# Patient Record
Sex: MALE | Race: OTHER | HISPANIC OR LATINO | URBAN - METROPOLITAN AREA
[De-identification: names, ages, dates, MRNs, and addresses within clinical notes are randomized per-mention and may not be internally consistent; named-entity substitution may affect disease eponyms.]

---

## 2022-10-21 ENCOUNTER — EMERGENCY (EMERGENCY)
Facility: HOSPITAL | Age: 27
LOS: 1 days | Discharge: HOME | End: 2022-10-21
Attending: EMERGENCY MEDICINE
Payer: MEDICAID

## 2022-10-21 VITALS
TEMPERATURE: 98 F | RESPIRATION RATE: 20 BRPM | SYSTOLIC BLOOD PRESSURE: 110 MMHG | OXYGEN SATURATION: 98 % | HEART RATE: 75 BPM | WEIGHT: 199.96 LBS | HEIGHT: 72 IN | DIASTOLIC BLOOD PRESSURE: 64 MMHG

## 2022-10-21 PROCEDURE — 99282 EMERGENCY DEPT VISIT SF MDM: CPT

## 2022-10-21 PROCEDURE — 99284 EMERGENCY DEPT VISIT MOD MDM: CPT

## 2022-10-21 NOTE — ED STATDOCS - NSFOLLOWUPINSTRUCTIONS_ED_ALL_ED_FT
Dolor de espalda sathish en los adultos    Acute Back Pain, Adult      El dolor de espalda sathish es repentino y por lo general no dura mucho tiempo. Se debe generalmente a jakob lesión de los músculos y tejidos de la espalda. La lesión puede ser el resultado de:  •Estiramiento en exceso o desgarro de un músculo, tendón o ligamento. Los ligamentos son tejidos que conectan los huesos. Levantar algo de forma incorrecta puede producir un esguince de espalda.      •Desgaste (degeneración) de los discos vertebrales. Los discos vertebrales son tejidos circulares que proporcionan amortiguación entre los huesos de la columna vertebral (vértebras).      •Movimientos de giro, jasbir al practicar deportes o realizar trabajos de jardinería.      •Un golpe en la espalda.      •Artritis.      Es posible que le realicen un examen físico, análisis de laboratorio u otros estudios de diagnóstico por imágenes para encontrar la causa del dolor. El dolor de espalda sathish generalmente desaparece con reposo y cuidados en la casa.      Siga estas instrucciones en pond casa:    Control del dolor, la rigidez y la hinchazón     •Use los medicamentos de venta sarah y los recetados solamente jasbir se lo haya indicado el médico. El tratamiento puede incluir medicamentos para el dolor y la inflamación que se asha por la boca o que se aplican sobre la piel, o relajantes musculares.    •El médico puede recomendarle que se aplique hielo darin las primeras 24 a 48 horas después del comienzo del dolor. Para hacer esto:  •Ponga el hielo en jakob bolsa plástica.      •Coloque jakob toalla entre la piel y la bolsa.      •Aplique el hielo darin 20 minutos, 2 o 3 veces por día.      •Retire el hielo si la piel se pone de color borden brillante. [employer] es muy importante. Si no puede sentir dolor, calor o frío, tiene un mayor riesgo de que se dañe la nichole.      •Si se lo indican, aplique calor en la nichole afectada con la frecuencia que le haya indicado el médico. Use la duy de calor que el médico le recomiende, jasbir jakob compresa de calor húmedo o jakob almohadilla térmica.  •Coloque jakob toalla entre la piel y la duy de calor.      •Aplique calor darin 20 a 30 minutos.      •Retire la duy de calor si la piel se pone de color borden brillante. [employer] es especialmente importante si no puede sentir dolor, calor o frío. Corre un mayor riesgo de sufrir quemaduras.          Actividad   Comparisons of good and bad posture while driving, standing, sitting at a desk, and lifting heavy objects.   • No permanezca en la cama. Hacer reposo en la cama por más de 1 a 2 días puede demorar pond recuperación.    •Mantenga jakob buena postura al sentarse y pararse. No se incline hacia adelante al sentarse ni se encorve al pararse.  •Si trabaja en un escritorio, siéntese cerca de yelena para no tener que inclinarse. Mantenga el mentón hacia abajo. Mantenga el shilpi hacia atrás y los codos flexionados en un ángulo de 90 grados (ángulo recto).      •Cuando conduzca, siéntese elevado y cerca del volante. Agregue un apoyo para la espalda (lumbar) al asiento del automóvil, si es necesario.        •Realice caminatas cortas en superficies saba tan pronto jasbir le sea posible. Trate de caminar un poco más de tiempo cada día.      • No se siente, conduzca o permanezca de pie en un mismo lugar darin más de 30 minutos seguidos. Pararse o sentarse darin largos períodos de tiempo puede sobrecargar la espalda.      • No conduzca ni use maquinaria pesada mientras mildred analgésicos recetados.    •Use técnicas apropiadas para levantar objetos. Cuando se inclina y levanta un objeto, utilice posiciones que no sobrecarguen tanto la espalda:  •Flexione las rodillas.      •Mantenga la carga cerca del cuerpo.      •No se tuerza.        •Kaley actividad física habitualmente jasbir se lo haya indicado el médico. Hacer ejercicios ayuda a que la espalda sane más rápido y ayuda a evitar las lesiones de la espalda al mantener los músculos radha y flexibles.      •Trabaje con un fisioterapeuta para crear un programa de ejercicios seguros, según lo recomiende el médico. Kaley ejercicios jasbir se lo haya indicado el fisioterapeuta.      Estilo de rogelio     •Mantenga un peso saludable. El sobrepeso sobrecarga la espalda y hace que resulte difícil tener jakob buena postura.      •Evite actividades o situaciones que lo tj sentirse ansioso o estresado. El estrés y la ansiedad aumentan la tensión muscular y pueden empeorar el dolor de espalda. Aprenda formas de manejar la ansiedad y el estrés, jasbir a través del ejercicio.      Instrucciones generales     •Duerma sobre un colchón firme en jakob posición cómoda. Intente acostarse de costado, con las rodillas ligeramente flexionadas. Si se recuesta sobre la espalda, coloque jakob almohada debajo de las rodillas.    •Mantenga la nomei y el shilpi en línea recta con la columna vertebral (posición neutra) cuando use equipos electrónicos jasbir teléfonos inteligentes o tablets. Para hacer esto:  •Levante el teléfono inteligente o la tablet para mirarlo en lugar de inclinar la noemi o el shilpi para mirar hacia abajo.      •Coloque el teléfono inteligente o la tablet al nivel de pond jatin mientras tata la pantalla.      •Siga el plan de tratamiento jasbir se lo haya indicado el médico. [employer] puede incluir:  •Terapia cognitiva o conductual.      •Acupuntura o terapia de masajes.      •Yoga o meditación.          Comuníquese con un médico si:    •Siente un dolor que no se codey con reposo o medicamentos.      •Siente mucho dolor que se extiende a las piernas o las nalgas.      •El dolor no mejora luego de 2 semanas.      •Siente dolor por la noche.      •Pierde peso sin proponérselo.      •Tiene fiebre o escalofríos.      •Siente náuseas o vómitos.      •Siente dolor abdominal.        Solicite ayuda de inmediato si:    •Tiene nuevos problemas para controlar la vejiga o los intestinos.      •Siente debilidad o adormecimiento inusuales en los brazos o en las piernas.      •Siente que va a desmayarse.      Estos síntomas pueden representar un problema grave que constituye jakob emergencia. No espere a nicolas si los síntomas desaparecen. Solicite atención médica de inmediato. Comuníquese con el servicio de emergencias de pond localidad (911 en los [place]). No conduzca por desire propios medios hasta el hospital.       Resumen    •El dolor de espalda sathish es repentino y por lo general no dura mucho tiempo.      •Use técnicas apropiadas para levantar objetos. Cuando se inclina y levanta un objeto, utilice posiciones que no sobrecarguen tanto la espalda.      •[place] los medicamentos de venta sarah y los recetados solamente jasbir se lo haya indicado el médico, y aplíquese calor o hielo según las indicaciones.      Esta información no tiene jasbir fin reemplazar el consejo del médico. Asegúrese de hacerle al médico cualquier pregunta que tenga.

## 2022-10-21 NOTE — ED ADULT TRIAGE NOTE - HEIGHT IN FEET
[General Appearance - Well Developed] : well developed [Normal Appearance] : normal appearance [Well Groomed] : well groomed [General Appearance - Well Nourished] : well nourished [No Deformities] : no deformities [General Appearance - In No Acute Distress] : no acute distress [Normal Conjunctiva] : the conjunctiva exhibited no abnormalities [Eyelids - No Xanthelasma] : the eyelids demonstrated no xanthelasmas [Normal Oropharynx] : normal oropharynx [Neck Appearance] : the appearance of the neck was normal 6 [Neck Cervical Mass (___cm)] : no neck mass was observed [Jugular Venous Distention Increased] : there was no jugular-venous distention [Thyroid Diffuse Enlargement] : the thyroid was not enlarged [Thyroid Nodule] : there were no palpable thyroid nodules [Heart Rate And Rhythm] : heart rate and rhythm were normal [Heart Sounds] : normal S1 and S2 [Murmurs] : no murmurs present [Exaggerated Use Of Accessory Muscles For Inspiration] : no accessory muscle use [Abdomen Soft] : soft [Abdomen Tenderness] : non-tender [Abdomen Mass (___ Cm)] : no abdominal mass palpated [Abnormal Walk] : normal gait [Gait - Sufficient For Exercise Testing] : the gait was sufficient for exercise testing [Nail Clubbing] : no clubbing of the fingernails [Cyanosis, Localized] : no localized cyanosis [Petechial Hemorrhages (___cm)] : no petechial hemorrhages [1+ Pitting] : 1+  pitting [Skin Color & Pigmentation] : normal skin color and pigmentation [] : no rash [No Venous Stasis] : no venous stasis [Skin Lesions] : no skin lesions [No Skin Ulcers] : no skin ulcer [No Xanthoma] : no  xanthoma was observed [Deep Tendon Reflexes (DTR)] : deep tendon reflexes were 2+ and symmetric [Sensation] : the sensory exam was normal to light touch and pinprick [No Focal Deficits] : no focal deficits [Oriented To Time, Place, And Person] : oriented to person, place, and time [Impaired Insight] : insight and judgment were intact [Affect] : the affect was normal [II] : II [FreeTextEntry1] : I:E ratio 1:3; mild inspiratory crackles at bases b/l [FreeTextEntry2] : 1+ LE edema

## 2022-10-21 NOTE — ED ADULT TRIAGE NOTE - CHIEF COMPLAINT QUOTE
"I'm having  pain to my  legs and thighs and its so uncomfortable to stand for long periods of time, I also feel numbness to both my legs, I have headache and body aches" pt said it started with headache and redness to his right eye

## 2022-10-21 NOTE — ED STATDOCS - PATIENT PORTAL LINK FT
You can access the FollowMyHealth Patient Portal offered by [facility] by registering at the following website: [email or web address]. By joining [facility]’s FollowMyHealth portal, you will also be able to view your health information using other applications (apps) compatible with our system.

## 2022-10-21 NOTE — ED STATDOCS - OBJECTIVE STATEMENT
26 y/o male with no PMHx presents to ED c/o multiple medical complaints. Patient reports 3 days of headache, leg pain, back pain, "pulsating" sensation in the heart, and numbness to the legs. Took Advil and Tylenol all with no relief.     Denies N/V/D, fever  : Stevan

## 2022-10-21 NOTE — ED STATDOCS - CLINICAL SUMMARY MEDICAL DECISION MAKING FREE TEXT BOX
28 y/o male with multiple complaints, headache at some point which has improved, back pain  with bilateral leg pain. Patient is neurovascularly intact, well appearing, neck supple no meningeal signs. likely muscular, but discussed need for follow up with clinic.